# Patient Record
Sex: MALE | Race: WHITE | Employment: FULL TIME | RURAL
[De-identification: names, ages, dates, MRNs, and addresses within clinical notes are randomized per-mention and may not be internally consistent; named-entity substitution may affect disease eponyms.]

---
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
## 2022-03-14 ENCOUNTER — TELEPHONE (OUTPATIENT)
Dept: FAMILY MEDICINE CLINIC | Age: 22
End: 2022-03-14

## 2022-03-14 ENCOUNTER — OFFICE VISIT (OUTPATIENT)
Dept: FAMILY MEDICINE CLINIC | Age: 22
End: 2022-03-14
Payer: COMMERCIAL

## 2022-03-14 ENCOUNTER — NURSE TRIAGE (OUTPATIENT)
Dept: OTHER | Facility: CLINIC | Age: 22
End: 2022-03-14

## 2022-03-14 VITALS
OXYGEN SATURATION: 99 % | TEMPERATURE: 98.3 F | WEIGHT: 267.6 LBS | RESPIRATION RATE: 20 BRPM | HEART RATE: 70 BPM | DIASTOLIC BLOOD PRESSURE: 89 MMHG | SYSTOLIC BLOOD PRESSURE: 146 MMHG | BODY MASS INDEX: 37.46 KG/M2 | HEIGHT: 71 IN

## 2022-03-14 DIAGNOSIS — S69.91XA INJURY OF RIGHT HAND, INITIAL ENCOUNTER: ICD-10-CM

## 2022-03-14 DIAGNOSIS — S62.316A CLOSED DISPLACED FRACTURE OF BASE OF FIFTH METACARPAL BONE OF RIGHT HAND, INITIAL ENCOUNTER: Primary | ICD-10-CM

## 2022-03-14 PROCEDURE — 99203 OFFICE O/P NEW LOW 30 MIN: CPT | Performed by: FAMILY MEDICINE

## 2022-03-14 NOTE — PROGRESS NOTES
1. Have you been to the ER, urgent care clinic since your last visit? Hospitalized since your last visit? No    2. Have you seen or consulted any other health care providers outside of the [address] [place] [address] since your last visit? Include any pap smears or colon screening. Yes When: has raymond fregoso gastro dr Veronica Pandey - 2 months ago     3. For patients aged 39-70: Has the patient had a colonoscopy / FIT/ Cologuard? Yes     If the patient is female:    4. For patients aged 41-77: Has the patient had a mammogram within the past 2 years? NA - based on age or sex      11. For patients aged 21-65: Has the patient had a pap smear? NA - based on age or sex     Reviewed record in preparation for visit and have necessary documentation  Pt did not bring medication to office visit for review  Patient is accompanied by self I have received verbal consent from Mansi Tracey to discuss any/all medical information while they are present in the room.     Goals that were addressed and/or need to be completed during or after this appointment include     Health Maintenance Due   Topic Date Due    Depression Screen  Never done    COVID-19 Vaccine (1) Never done    HPV Age 9Y-34Y (4 - Male 3-dose series) 12/02/2017    Flu Vaccine (1) 09/01/2021

## 2022-03-14 NOTE — TELEPHONE ENCOUNTER
Pt made aware that work note was ready for pickup at the . Asked pt about pain because Dr. Reza Gross stated he could order pain medication for him but pt stated that he could hold out until tomorrow when he sees ortho.

## 2022-03-14 NOTE — TELEPHONE ENCOUNTER
Received call from [facility] at [facility] with [employer] Complaint. Limited triage due to caller not with pt    Subjective: Caller states \"he fell late Friday night/ Saturday morning and landed on his right hand and it is swollen and bruised. Unable to  anything. Mom thinks it may be broken\"     Current Symptoms: swollen bruised right hand    Onset: 2 days ago; sudden    Associated Symptoms: NA    Pain Severity: Mom's guess is 7-8/10   Temperature:denies fever    What has been tried: ice, Tylenol    LMP: NA Pregnant: NA    Recommended disposition: See in Office Today    Care advice provided, patient verbalizes understanding; denies any other questions or concerns; instructed to call back for any new or worsening symptoms. Patient/Caller agrees with recommended disposition; writer provided warm transfer to Yousuf at [facility] for appointment scheduling    Attention Provider: Thank you for allowing me to participate in the care of your patient. The patient was connected to triage in response to information provided to the ECC. Please do not respond through this encounter as the response is not directed to a shared pool.       Reason for Disposition   Large swelling or bruise and size > palm of person's hand    Protocols used: HAND AND WRIST INJURY-ADULT-OH
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
## 2022-03-15 ENCOUNTER — OFFICE VISIT (OUTPATIENT)
Dept: ORTHOPEDIC SURGERY | Age: 22
End: 2022-03-15
Payer: COMMERCIAL

## 2022-03-15 VITALS — HEIGHT: 70 IN | BODY MASS INDEX: 37.94 KG/M2 | WEIGHT: 265 LBS

## 2022-03-15 DIAGNOSIS — S62.316A CLOSED DISPLACED FRACTURE OF BASE OF FIFTH METACARPAL BONE OF RIGHT HAND, INITIAL ENCOUNTER: Primary | ICD-10-CM

## 2022-03-15 PROCEDURE — 29125 APPL SHORT ARM SPLINT STATIC: CPT | Performed by: ORTHOPAEDIC SURGERY

## 2022-03-15 PROCEDURE — 99203 OFFICE O/P NEW LOW 30 MIN: CPT | Performed by: ORTHOPAEDIC SURGERY

## 2022-03-15 NOTE — LETTER
NOTIFICATION TO RETURN TO WORK / SCHOOL           Mr. Tammy Tracey  [address] [address]        To Whom It May Concern:      Please excuse Tammy Tracey for an appointment in our office on 3/15/2022. If you have any questions, or if we may be of further assistance, do not hesitate to contact us at [phone]. Restrictions: No work for 3 weeks.       Sincerely,    Adonis Quintero MD  [facility]
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
## 2022-03-18 PROBLEM — H52.209 ASTIGMATISM: Status: ACTIVE | Noted: 2017-02-20

## 2022-03-23 ENCOUNTER — OFFICE VISIT (OUTPATIENT)
Dept: ORTHOPEDIC SURGERY | Age: 22
End: 2022-03-23
Payer: COMMERCIAL

## 2022-03-23 VITALS — WEIGHT: 265 LBS | BODY MASS INDEX: 38.02 KG/M2

## 2022-03-23 DIAGNOSIS — S62.316D DISPLACED FRACTURE OF BASE OF FIFTH METACARPAL BONE, RIGHT HAND, SUBSEQUENT ENCOUNTER FOR FRACTURE WITH ROUTINE HEALING: Primary | ICD-10-CM

## 2022-03-23 PROCEDURE — 99213 OFFICE O/P EST LOW 20 MIN: CPT | Performed by: ORTHOPAEDIC SURGERY

## 2022-03-23 NOTE — PROGRESS NOTES
Brianna Reed (: 2000) is a 22 y.o. male patient here for evaluation of the following chief complaint(s):  Hand Pain (right fifth metacarpal fracture folllow up)       ASSESSMENT/PLAN:  Below is the assessment and plan developed based on review of pertinent history, physical exam, labs, studies, and medications. 1. Displaced fracture of base of fifth metacarpal bone, right hand, subsequent encounter for fracture with routine healing  -     XR HAND RT MIN 3 V; Future      25-year-old male with right small finger metacarpal base fracture. Currently he has well-maintained joint space and overall good alignment. Recommended continuing conservative management. Recommended follow-up in 2 week for reevaluation, removal of cast prior to x-rays, and repeat x-rays 3 views of right hand. If doing well at next visit recommend discontinuing the splint transition to a removable brace and slowly increase activity. Follow-up and Dispositions    · Return in about 2 weeks (around 2022). Patient verbalized understanding and elected to proceed. All questions were answered to the patient's apparent satisfaction. SUBJECTIVE/OBJECTIVE:  HPI  25-year-old male following up on his right small finger metacarpal base fracture. Patient states pain is improving. States the splint is comfortable. Patient reports a sudden onset of symptoms. Duration of problem 2 weeks, occurred on 3/11/2022  Symptom Severity 6/10  Symptom Frequency intermittent        No Known Allergies    Current Outpatient Medications   Medication Sig    azaTHIOprine (IMURAN) 50 mg tablet Take 3 Tabs by mouth daily.  montelukast (SINGULAIR) 10 mg tablet TAKE 1 TABLET BY MOUTH ONCE DAILY FOR ALLERGIES (Patient not taking: Reported on 3/14/2022)    benzonatate (TESSALON) 200 mg capsule Take 1 Cap by mouth three (3) times daily as needed for Cough.  (Patient not taking: Reported on 3/14/2022)    ondansetron (ZOFRAN ODT) 8 mg disintegrating tablet Take 1 Tab by mouth every eight (8) hours as needed for Nausea. (Patient not taking: Reported on 3/14/2022)    diclofenac (VOLTAREN) 1 % gel Apply 0.5 g to affected area four (4) times daily as needed for Pain. Apply to right ankle for pain. (Patient not taking: Reported on 3/14/2022)    omeprazole (PRILOSEC) 20 mg capsule Take 20 mg by mouth daily. (Patient not taking: Reported on 3/23/2022)    mesalamine ER (APRISO) 0.375 gram capsule Take 4 Caps by mouth daily. (Patient not taking: Reported on 3/15/2022)    traZODone (DESYREL) 50 mg tablet Take  by mouth nightly. (Patient not taking: Reported on 3/14/2022)    escitalopram oxalate (LEXAPRO) 20 mg tablet Take 1 tablet by mouth daily. (Patient not taking: Reported on 3/14/2022)    minocycline (MINOCIN, DYNACIN) 100 mg capsule Take 1 capsule by mouth. (Patient not taking: Reported on 3/14/2022)    multivitamin (ONE A DAY) tablet Take 1 Tab by mouth daily. (Patient not taking: Reported on 3/23/2022)     No current facility-administered medications for this visit. Social History     Socioeconomic History    Marital status: SINGLE     Spouse name: Not on file    Number of children: Not on file    Years of education: Not on file    Highest education level: Not on file   Occupational History    Not on file   Tobacco Use    Smoking status: Never Smoker    Smokeless tobacco: Never Used   Substance and Sexual Activity    Alcohol use: No    Drug use: No    Sexual activity: Never   Other Topics Concern    Not on file   Social History Narrative    Not on file     Social Determinants of Health     Financial Resource Strain:     Difficulty of Paying Living Expenses: Not on file   Food Insecurity:     Worried About Running Out of Food in the Last Year: Not on file    Joey of Food in the Last Year: Not on file   Transportation Needs:     Lack of Transportation (Medical):  Not on file    Lack of Transportation (Non-Medical): Not on file   Physical Activity:     Days of Exercise per Week: Not on file    Minutes of Exercise per Session: Not on file   Stress:     Feeling of Stress : Not on file   Social Connections:     Frequency of Communication with Friends and Family: Not on file    Frequency of Social Gatherings with Friends and Family: Not on file    Attends [employer] Services: Not on file    Active Member of [address] [place] [address] or Organizations: Not on file    Attends Club or Organization Meetings: Not on file    Marital Status: Not on file   Intimate Partner Violence:     Fear of Current or Ex-Partner: Not on file    Emotionally Abused: Not on file    Physically Abused: Not on file    Sexually Abused: Not on file   Housing Stability:     Unable to Pay for Housing in the Last Year: Not on file    Number of [place] in the Last Year: Not on file    Unstable Housing in the Last Year: Not on file       Past Surgical History:   Procedure Laterality Date    HX COLONOSCOPY      HX ENDOSCOPY      [place] BIOPSY OF BREAST, INCISIONAL      liver        History reviewed. No pertinent family history. Review of Systems    No flowsheet data found. Vitals: Wt 265 lb (120.2 kg)   BMI 38.02 kg/m²    Estimated body surface area is 2.44 meters squared as calculated from the following:    Height as of 3/15/22: 5' 10\" (1.778 m). Weight as of this encounter: 265 lb (120.2 kg). Body mass index is 38.02 kg/m². Physical Exam    Musculoskeletal Exam:    Right Upper Extremity EXAMINATION    Splint is intact, move digits with no difficulty. Patient has intact range of motion of the hand and all digits. Some pain with range of motion. Patient fires AIN, PIN and ulnar nerves. Sensation is grossly intact in the median, radial and ulnar distribution. Hand is pink and appears well-perfused. Hand is warm. Skin is intact. Compartments are soft and compressible.       Consitutional: Healthy  Skin:   - Edema -minimal right hand  - Cellulitis - No    Neuro: Numbness or tingling in R/L arm: no    Psych: Affect normal    Cardiovascular: Capillary Refill < 2 seconds in upper extremities    Respiratory: Non-Labored Breathing    ROS:    Constitutional: Denies fever/chills    Respiratory: Denies SOB        Imaging:    XR Results (most recent):  Results from Appointment encounter on 03/23/22    XR HAND RT MIN 3 V    Narrative  Right Hand Xray  Indication: pain  Views: 3 views, AP/LAT/OBL    Interpretation: 3 views of the right hand are reviewed and show unchanged alignment from the prior images. There remains a small finger metacarpal base fracture. The joint is well maintained. No other new abnormalities are noted. Orders Placed This Encounter    XR HAND RT MIN 3 V     Standing Status:   Future     Number of Occurrences:   1     Standing Expiration Date:   3/24/2023          An electronic signature was used to authenticate this note.   -- Aislinn Alexandre MD

## 2022-04-05 ENCOUNTER — OFFICE VISIT (OUTPATIENT)
Dept: ORTHOPEDIC SURGERY | Age: 22
End: 2022-04-05

## 2022-04-05 DIAGNOSIS — S62.316D DISPLACED FRACTURE OF BASE OF FIFTH METACARPAL BONE, RIGHT HAND, SUBSEQUENT ENCOUNTER FOR FRACTURE WITH ROUTINE HEALING: Primary | ICD-10-CM

## 2022-04-05 PROCEDURE — 99213 OFFICE O/P EST LOW 20 MIN: CPT | Performed by: ORTHOPAEDIC SURGERY

## 2022-04-05 NOTE — LETTER
NOTIFICATION TO RETURN TO WORK / SCHOOL           Mr. Dheeraj Sue  [address] [address]        To Whom It May Concern:      Please excuse Dheeraj Sue for an appointment in our office on 4/5/2022. If you have any questions, or if we may be of further assistance, do not hesitate to contact us at [phone]. Restrictions:    He is not to work prior to April 18, 2022.     Sincerely,    Lisa Vance MD  [facility]

## 2022-04-05 NOTE — PROGRESS NOTES
Carrie De La Vega (: 2000) is a 22 y.o. male patient here for evaluation of the following chief complaint(s):  Follow-up (right hand)       ASSESSMENT/PLAN:  Below is the assessment and plan developed based on review of pertinent history, physical exam, labs, studies, and medications. 1. Displaced fracture of base of fifth metacarpal bone, right hand, subsequent encounter for fracture with routine healing  -     XR HAND RT MIN 3 V; Future  -     REFERRAL TO DME      60-year-old male with right small finger metacarpal base fracture. Patient is doing well at this time. He was transition to a removable brace. He can return to work full duty on 2022 as long as he is asymptomatic. We discussed follow-up today. Patient verbalized understanding and elected to proceed. All questions were answered to the patient's apparent satisfaction. SUBJECTIVE/OBJECTIVE:  Hand Pain    Follow-up      60-year-old male following up on his right small finger metacarpal base fracture. Patient states pain is improving. States the splint is comfortable. Patient reports a sudden onset of symptoms. Duration of problem 4 weeks, occurred on 3/11/2022  Symptom Severity 4/10  Symptom Frequency intermittent        No Known Allergies    Current Outpatient Medications   Medication Sig    montelukast (SINGULAIR) 10 mg tablet TAKE 1 TABLET BY MOUTH ONCE DAILY FOR ALLERGIES (Patient not taking: Reported on 3/14/2022)    benzonatate (TESSALON) 200 mg capsule Take 1 Cap by mouth three (3) times daily as needed for Cough. (Patient not taking: Reported on 3/14/2022)    ondansetron (ZOFRAN ODT) 8 mg disintegrating tablet Take 1 Tab by mouth every eight (8) hours as needed for Nausea. (Patient not taking: Reported on 3/14/2022)    diclofenac (VOLTAREN) 1 % gel Apply 0.5 g to affected area four (4) times daily as needed for Pain. Apply to right ankle for pain.  (Patient not taking: Reported on 3/14/2022)    omeprazole (PRILOSEC) 20 mg capsule Take 20 mg by mouth daily. (Patient not taking: Reported on 3/23/2022)    azaTHIOprine (IMURAN) 50 mg tablet Take 3 Tabs by mouth daily.  mesalamine ER (APRISO) 0.375 gram capsule Take 4 Caps by mouth daily. (Patient not taking: Reported on 3/15/2022)    traZODone (DESYREL) 50 mg tablet Take  by mouth nightly. (Patient not taking: Reported on 3/14/2022)    escitalopram oxalate (LEXAPRO) 20 mg tablet Take 1 tablet by mouth daily. (Patient not taking: Reported on 3/14/2022)    minocycline (MINOCIN, DYNACIN) 100 mg capsule Take 1 capsule by mouth. (Patient not taking: Reported on 3/14/2022)    multivitamin (ONE A DAY) tablet Take 1 Tab by mouth daily. (Patient not taking: Reported on 3/23/2022)     No current facility-administered medications for this visit. Social History     Socioeconomic History    Marital status: SINGLE     Spouse name: Not on file    Number of children: Not on file    Years of education: Not on file    Highest education level: Not on file   Occupational History    Not on file   Tobacco Use    Smoking status: Never Smoker    Smokeless tobacco: Never Used   Substance and Sexual Activity    Alcohol use: No    Drug use: No    Sexual activity: Never   Other Topics Concern    Not on file   Social History Narrative    Not on file     Social Determinants of Health     Financial Resource Strain:     Difficulty of Paying Living Expenses: Not on file   Food Insecurity:     Worried About Running Out of Food in the Last Year: Not on file    Joey of Food in the Last Year: Not on file   Transportation Needs:     Lack of Transportation (Medical): Not on file    Lack of Transportation (Non-Medical):  Not on file   Physical Activity:     Days of Exercise per Week: Not on file    Minutes of Exercise per Session: Not on file   Stress:     Feeling of Stress : Not on file   Social Connections:     Frequency of Communication with Friends and Family: Not on file    Frequency of Social Gatherings with Friends and Family: Not on file    Attends [employer] Services: Not on file    Active Member of Clubs or Organizations: Not on file    Attends Club or Organization Meetings: Not on file    Marital Status: Not on file   Intimate Partner Violence:     Fear of Current or Ex-Partner: Not on file    Emotionally Abused: Not on file    Physically Abused: Not on file    Sexually Abused: Not on file   Housing Stability:     Unable to Pay for Housing in the Last Year: Not on file    Number of [place] in the Last Year: Not on file    Unstable Housing in the Last Year: Not on file       Past Surgical History:   Procedure Laterality Date    HX COLONOSCOPY      HX ENDOSCOPY      [place] BIOPSY OF BREAST, INCISIONAL      liver        History reviewed. No pertinent family history. Review of Systems    No flowsheet data found. Vitals: There were no vitals taken for this visit. Estimated body surface area is 2.44 meters squared as calculated from the following:    Height as of 3/15/22: 5' 10\" (1.778 m). Weight as of 3/23/22: 265 lb (120.2 kg). There is no height or weight on file to calculate BMI. Physical Exam    Musculoskeletal Exam:    Right Upper Extremity EXAMINATION    Splint removed, skin intact. Mild tenderness over the fracture site. Patient has intact range of motion of the hand and all digits. Some soreness with range of motion. Patient fires AIN, PIN and ulnar nerves. Sensation is grossly intact in the median, radial and ulnar distribution. Hand is pink and appears well-perfused. Hand is warm. Skin is intact. Compartments are soft and compressible.       Consitutional: Healthy  Skin:   - Edema -minimal right hand  - Cellulitis - No    Neuro: Numbness or tingling in R/L arm: no    Psych: Affect normal    Cardiovascular: Capillary Refill < 2 seconds in upper extremities    Respiratory: Non-Labored Breathing    ROS:    Constitutional: Denies fever/chills    Respiratory: Denies SOB        Imaging:    XR Results (most recent):  Results from Appointment encounter on 04/05/22    XR HAND RT MIN 3 V    Narrative  Right Hand Xray  Indication: pain  Views: 3 views, AP/LAT/OBL    Interpretation: 3 views of the right hand are reviewed and show unchanged alignment from the prior images. There remains a small finger metacarpal base fracture. The joint is well maintained. There is interval callous formation noted today. Orders Placed This Encounter    XR HAND RT MIN 3 V     OOC     Standing Status:   Future     Number of Occurrences:   1     Standing Expiration Date:   4/5/2023    REFERRAL TO DME     Referral Priority:   Routine     Referral Type:   Consultation     Referral Reason:   Specialty Services Required     Number of Visits Requested:   1          An electronic signature was used to authenticate this note.   -- Kandice Lewis MD

## 2023-02-07 ENCOUNTER — OFFICE VISIT (OUTPATIENT)
Dept: FAMILY MEDICINE CLINIC | Age: 23
End: 2023-02-07
Payer: COMMERCIAL

## 2023-02-07 VITALS
BODY MASS INDEX: 38.51 KG/M2 | OXYGEN SATURATION: 98 % | DIASTOLIC BLOOD PRESSURE: 92 MMHG | TEMPERATURE: 98.7 F | HEIGHT: 70 IN | SYSTOLIC BLOOD PRESSURE: 148 MMHG | HEART RATE: 100 BPM | RESPIRATION RATE: 18 BRPM | WEIGHT: 269 LBS

## 2023-02-07 DIAGNOSIS — J02.9 SORE THROAT: ICD-10-CM

## 2023-02-07 DIAGNOSIS — J35.8 TONSILLAR EXUDATE: Primary | ICD-10-CM

## 2023-02-07 DIAGNOSIS — R05.1 ACUTE COUGH: ICD-10-CM

## 2023-02-07 DIAGNOSIS — I10 HYPERTENSION, UNSPECIFIED TYPE: ICD-10-CM

## 2023-02-07 LAB
S PYO AG THROAT QL: NEGATIVE
VALID INTERNAL CONTROL?: YES

## 2023-02-07 RX ORDER — AMOXICILLIN 500 MG/1
500 CAPSULE ORAL 2 TIMES DAILY
Qty: 20 CAPSULE | Refills: 0 | Status: SHIPPED | OUTPATIENT
Start: 2023-02-07 | End: 2023-02-17

## 2023-02-07 NOTE — PROGRESS NOTES
History of Present Illness:  Pat Guidry is a 25 y.o. male who presents for cough and sore throat. Symptoms started 2 weeks ago with a wet cough (productive of green sputum) and some nasal congestion. He developed a sore throat this past week. Denies fevers, hemoptysis, ear pain, sick contacts, GI symps. Has not been vaccinated for Flu or Covid. Lives at home with his mom and sisters who have not had similar symps. Past Medical History:   Diagnosis Date    Allergic rhinitis     Crohn disease ([address]        Past Surgical History:   Procedure Laterality Date    HX COLONOSCOPY      HX ENDOSCOPY      AR BIOPSY BREAST OPEN INCISIONAL      liver        Current Outpatient Medications   Medication Sig Dispense Refill    guaiFENesin-dextromethorphan SR (HUMIBID DM) 600-30 mg per tablet Take 1 Tablet by mouth two (2) times a day for 10 days. 20 Tablet 0    benzocaine 15 mg lozg 15 mg by Mucous Membrane route two (2) times daily as needed for Pain or Cough for up to 10 days. 20 Lozenge 0    amoxicillin (AMOXIL) 500 mg capsule Take 1 Capsule by mouth two (2) times a day for 10 days. 20 Capsule 0    omeprazole (PRILOSEC) 20 mg capsule Take 20 mg by mouth daily. multivitamin (ONE A DAY) tablet Take 1 Tablet by mouth daily. montelukast (SINGULAIR) 10 mg tablet TAKE 1 TABLET BY MOUTH ONCE DAILY FOR ALLERGIES (Patient not taking: Reported on 3/14/2022) 30 Tab 2    benzonatate (TESSALON) 200 mg capsule Take 1 Cap by mouth three (3) times daily as needed for Cough. (Patient not taking: Reported on 3/14/2022) 30 Cap 1    ondansetron (ZOFRAN ODT) 8 mg disintegrating tablet Take 1 Tab by mouth every eight (8) hours as needed for Nausea. (Patient not taking: Reported on 3/14/2022) 20 Tab 0    diclofenac (VOLTAREN) 1 % gel Apply 0.5 g to affected area four (4) times daily as needed for Pain. Apply to right ankle for pain.  (Patient not taking: Reported on 3/14/2022) 1 Each 0    azaTHIOprine (IMURAN) 50 mg tablet Take 3 Tabs by mouth daily. 75 Tab 2    mesalamine ER (APRISO) 0.375 gram capsule Take 4 Caps by mouth daily. (Patient not taking: Reported on 3/15/2022) 30 Cap 2    traZODone (DESYREL) 50 mg tablet Take  by mouth nightly. (Patient not taking: Reported on 3/14/2022)      escitalopram oxalate (LEXAPRO) 20 mg tablet Take 1 tablet by mouth daily. (Patient not taking: Reported on 3/14/2022)      minocycline (MINOCIN, DYNACIN) 100 mg capsule Take 1 capsule by mouth. (Patient not taking: Reported on 3/14/2022)         No Known Allergies    Social History     Tobacco Use    Smoking status: Never    Smokeless tobacco: Never   Substance Use Topics    Alcohol use: No    Drug use: No       History reviewed. No pertinent family history. Physical Examination:     Visit Vitals  BP (!) 148/92   Pulse 100   Temp 98.7 °F (37.1 °C) (Oral)   Resp 18   Ht 5' 10\" (1.778 m)   Wt 269 lb (122 kg)   SpO2 98%   BMI 38.60 kg/m²       GEN: No apparent distress. Alert and oriented and responds to all questions appropriately. EYES:  Conjunctiva clear; extraocular movements areintact. EAR: ADRIANO ear canals erythematous, ADRIANO tympanic membranes slightly congested. External ears are normal.   NOSE: Turbinates are within normal limits. No drainage  OROPHYARYNX: White striations and tonsillar erythema bilaterally  NECK:  tender to mild palpation of submandibular and cervical lymph nodes      LUNGS: Respirations unlabored; clear to auscultation bilaterally  CARDIOVASCULAR: Regular, rate, and rhythm without murmurs, gallops or rubs   NEUROLOGIC:  No focal neurologic deficits. Coordination and gait grossly intact. EXT: Well perfused. No edema. SKIN: No obvious rashes. Assessment/Plan:    Diagnoses and all orders for this visit:    1. Tonsillar exudate - acute  - rapid strep negative.  However, given significant exam findings of white tonsillar striations and tender lymph nodes will treat for strep throat  - rapid covid negative    -     AMB POC RAPID STREP A  -     amoxicillin (AMOXIL) 500 mg capsule; Take 1 Capsule by mouth two (2) times a day for 10 days. 2. Acute cough   - bothersome productive cough for 2 weeks. -     guaiFENesin-dextromethorphan SR (HUMIBID DM) 600-30 mg per tablet; Take 1 Tablet by mouth two (2) times a day for 10 days. 3. Sore throat - acute  - most likely 2/2 strep given white tonsillar striations and erythema    -     benzocaine 15 mg lozg; 15 mg by Mucous Membrane route two (2) times daily as needed for Pain or Cough for up to 10 days.  -     AMB POC RAPID STREP A    4. Hypertension - uncontrolled   - BP elevated x2 in office today. Per chart review, seems he has had elevated readings since 3/2022. Unclear whether white coat HTN or true HTN.   - Patient says his mom has a BP machine at home. He was given a BP log and asked to record his home BP BID and follow up with our office in 1-2 weeks. Encounter Diagnoses     ICD-10-CM ICD-9-CM   1. Tonsillar exudate  J35.8 474.8   2. Acute cough  R05.1 786.2   3. Sore throat  J02.9 462   4. Hypertension, unspecified type  I10 401.9         Follow-up and Dispositions    Return in about 2 weeks (around 2/21/2023) for BP check. Mattie Moya expressed understanding of this plan. An AVS was printed and given to the patient. Seen and discussed with attending.     Eusebio Ghotra MD  Family Medicine PGY-1

## 2023-02-07 NOTE — LETTER
NOTIFICATION RETURN TO WORK    2/7/2023 4:53 PM    Mr. Mica Ferguson  [address] [place] [address]      To Whom It May Concern:    Mica Ferguson is currently under the care of [facility]  Please excuse him for 2/7 and 2/8. He will return to work on 2/9. If there are questions or concerns please have the patient contact our office.         Sincerely,      Kwabena Pruett MD

## 2023-02-07 NOTE — PROGRESS NOTES
1. Have you been to the ER, urgent care clinic since your last visit? Hospitalized since your last visit?  no    2. Have you seen or consulted any other health care providers outside of the [address] [place] [address] since your last visit? GI doc but doesn't recall name      3. For patients aged 39-70: Has the patient had a colonoscopy / FIT/ Cologuard? Yes 2022      If the patient is female:    4. For patients aged 41-77: Has the patient had a mammogram within the past 2 years? 5. For patients aged 21-65: Has the patient had a pap smear?        Opportunity was given for questions    Goals that were addressed and/or need to be completed during or after this appointment include   Health Maintenance Due   Topic Date Due    Hepatitis C Screening  Never done    COVID-19 Vaccine (1) Never done    HPV Age 9Y-34Y (4 - Male 3-dose series) 12/02/2017    Shingles Vaccine (1 of 2) Never done    Flu Vaccine (1) 08/01/2022

## 2023-02-09 ENCOUNTER — TELEPHONE (OUTPATIENT)
Dept: FAMILY MEDICINE CLINIC | Age: 23
End: 2023-02-09

## 2023-07-20 ENCOUNTER — OFFICE VISIT (OUTPATIENT)
Facility: CLINIC | Age: 23
End: 2023-07-20
Payer: COMMERCIAL

## 2023-07-20 VITALS
HEART RATE: 78 BPM | TEMPERATURE: 97.7 F | SYSTOLIC BLOOD PRESSURE: 129 MMHG | RESPIRATION RATE: 18 BRPM | DIASTOLIC BLOOD PRESSURE: 82 MMHG | WEIGHT: 268.2 LBS | OXYGEN SATURATION: 99 % | HEIGHT: 70 IN | BODY MASS INDEX: 38.39 KG/M2

## 2023-07-20 DIAGNOSIS — Z23 NEED FOR VIRAL IMMUNIZATION: ICD-10-CM

## 2023-07-20 DIAGNOSIS — K50.90 CROHN'S DISEASE WITHOUT COMPLICATION, UNSPECIFIED GASTROINTESTINAL TRACT LOCATION (HCC): ICD-10-CM

## 2023-07-20 DIAGNOSIS — Z00.00 VISIT FOR WELL MAN HEALTH CHECK: ICD-10-CM

## 2023-07-20 DIAGNOSIS — Z00.00 ENCOUNTER FOR WELL ADULT EXAM WITHOUT ABNORMAL FINDINGS: Primary | ICD-10-CM

## 2023-07-20 PROCEDURE — 99395 PREV VISIT EST AGE 18-39: CPT | Performed by: FAMILY MEDICINE

## 2023-07-20 SDOH — ECONOMIC STABILITY: FOOD INSECURITY: WITHIN THE PAST 12 MONTHS, YOU WORRIED THAT YOUR FOOD WOULD RUN OUT BEFORE YOU GOT MONEY TO BUY MORE.: NEVER TRUE

## 2023-07-20 SDOH — ECONOMIC STABILITY: INCOME INSECURITY: HOW HARD IS IT FOR YOU TO PAY FOR THE VERY BASICS LIKE FOOD, HOUSING, MEDICAL CARE, AND HEATING?: NOT HARD AT ALL

## 2023-07-20 SDOH — ECONOMIC STABILITY: HOUSING INSECURITY
IN THE LAST 12 MONTHS, WAS THERE A TIME WHEN YOU DID NOT HAVE A STEADY PLACE TO SLEEP OR SLEPT IN A SHELTER (INCLUDING NOW)?: NO

## 2023-07-20 SDOH — ECONOMIC STABILITY: FOOD INSECURITY: WITHIN THE PAST 12 MONTHS, THE FOOD YOU BOUGHT JUST DIDN'T LAST AND YOU DIDN'T HAVE MONEY TO GET MORE.: NEVER TRUE

## 2023-07-20 ASSESSMENT — ANXIETY QUESTIONNAIRES
3. WORRYING TOO MUCH ABOUT DIFFERENT THINGS: 0
2. NOT BEING ABLE TO STOP OR CONTROL WORRYING: 0
4. TROUBLE RELAXING: 0
IF YOU CHECKED OFF ANY PROBLEMS ON THIS QUESTIONNAIRE, HOW DIFFICULT HAVE THESE PROBLEMS MADE IT FOR YOU TO DO YOUR WORK, TAKE CARE OF THINGS AT HOME, OR GET ALONG WITH OTHER PEOPLE: NOT DIFFICULT AT ALL
1. FEELING NERVOUS, ANXIOUS, OR ON EDGE: 0
6. BECOMING EASILY ANNOYED OR IRRITABLE: 0
5. BEING SO RESTLESS THAT IT IS HARD TO SIT STILL: 0
GAD7 TOTAL SCORE: 0
7. FEELING AFRAID AS IF SOMETHING AWFUL MIGHT HAPPEN: 0

## 2023-07-20 ASSESSMENT — ENCOUNTER SYMPTOMS
APNEA: 0
BACK PAIN: 0
CHEST TIGHTNESS: 0

## 2023-07-20 ASSESSMENT — PATIENT HEALTH QUESTIONNAIRE - PHQ9
SUM OF ALL RESPONSES TO PHQ QUESTIONS 1-9: 0
SUM OF ALL RESPONSES TO PHQ QUESTIONS 1-9: 0
SUM OF ALL RESPONSES TO PHQ9 QUESTIONS 1 & 2: 0
1. LITTLE INTEREST OR PLEASURE IN DOING THINGS: 0
SUM OF ALL RESPONSES TO PHQ QUESTIONS 1-9: 0
SUM OF ALL RESPONSES TO PHQ QUESTIONS 1-9: 0
2. FEELING DOWN, DEPRESSED OR HOPELESS: 0

## 2023-07-20 NOTE — PROGRESS NOTES
1. \"Have you been to the ER, urgent care clinic since your last visit? Hospitalized since your last visit? \" no    2. \"Have you seen or consulted any other health care providers outside of the [address] [place] [address] since your last visit? \" no     Health Maintenance Due   Topic Date Due    COVID-19 Vaccine (1) Never done    Pneumococcal 0-64 years Vaccine (1 - PCV) 06/11/2007    HIV screen  Never done    HPV vaccine (3 - Male 3-dose series) 12/02/2017    Hepatitis C screen  Never done    Shingles vaccine (1 of 2) 06/11/2020    DTaP/Tdap/Td vaccine (7 - Td or Tdap) 05/23/2023
atraumatic. Cardiovascular:      Rate and Rhythm: Normal rate and regular rhythm. Pulses: Normal pulses. Heart sounds: Normal heart sounds. No murmur heard. No friction rub. No gallop. Pulmonary:      Effort: Pulmonary effort is normal.      Breath sounds: Normal breath sounds. Abdominal:      General: Abdomen is flat. Bowel sounds are normal.      Palpations: Abdomen is soft. Musculoskeletal:         General: Normal range of motion. Cervical back: Normal range of motion and neck supple. Skin:     General: Skin is warm and dry. Neurological:      Mental Status: He is alert. Pertinent Labs/Studies:  No results found for: PSA, PSA2, PSAR1, PSA1, PSA3, [id number], PSAEXT  No results found for: CHOL, CHOLPOCT, CHOLX, CHLST, CHOLV, HDL, HDLPOC, HDLC, LDL, LDLC, VLDLC, VLDL, TGLX, TRIGL  No results found for: NA, K, CL, CO2, AGAP, GLU, BUN, CREA, GFRAA, CA, TP, ALB, GLOB, ALT    Assessment and orders:      Diagnosis Orders   1. Encounter for well adult exam without abnormal findings        2. Visit for [facility] health check        3. Crohn's disease without complication, unspecified gastrointestinal tract location ([address])        4. Need for viral immunization  Tetanus-Diphth-Acell Pertussis ([facility]) 5-2.5-18.5 LF-MCG/0.5 injection        Noman Pal was seen today for employment physical.    Diagnoses and all orders for this visit:    Encounter for well adult exam without abnormal findings    Visit for [facility] health check    Crohn's disease without complication, unspecified gastrointestinal tract location [facility])    Need for viral immunization  -     Tetanus-Diphth-Acell Pertussis ([facility]) 5-2.5-18.5 LF-MCG/0.5 injection; Inject 0.5 mLs into the muscle once for 1 dose      Follow-up and Dispositions    Return in about 1 year (around 7/20/2024). I have discussed the diagnosis with the patient and the intended plan as seen in the above orders.   Social history, medical history, and

## 2023-11-14 ENCOUNTER — OFFICE VISIT (OUTPATIENT)
Facility: CLINIC | Age: 23
End: 2023-11-14
Payer: COMMERCIAL

## 2023-11-14 VITALS
WEIGHT: 257.4 LBS | OXYGEN SATURATION: 99 % | BODY MASS INDEX: 36.85 KG/M2 | SYSTOLIC BLOOD PRESSURE: 140 MMHG | HEART RATE: 79 BPM | TEMPERATURE: 98.2 F | RESPIRATION RATE: 18 BRPM | DIASTOLIC BLOOD PRESSURE: 84 MMHG | HEIGHT: 70 IN

## 2023-11-14 DIAGNOSIS — K52.0 GASTROENTERITIS AND COLITIS DUE TO RADIATION: ICD-10-CM

## 2023-11-14 DIAGNOSIS — E78.2 MIXED HYPERLIPIDEMIA: Primary | ICD-10-CM

## 2023-11-14 PROCEDURE — 99214 OFFICE O/P EST MOD 30 MIN: CPT | Performed by: FAMILY MEDICINE

## 2023-11-14 RX ORDER — PRAVASTATIN SODIUM 40 MG
40 TABLET ORAL DAILY
Qty: 90 TABLET | Refills: 1 | Status: SHIPPED | OUTPATIENT
Start: 2023-11-14

## 2023-11-14 ASSESSMENT — ENCOUNTER SYMPTOMS
CHOKING: 0
SHORTNESS OF BREATH: 0
ABDOMINAL PAIN: 0
COUGH: 0
CHEST TIGHTNESS: 0

## 2023-11-14 NOTE — PROGRESS NOTES
[facility]    History of Present Illness:   Angel Luis Olivas is a 26 y.o. male with history of HLD, Obesity  CC: Follow up Cholesterol issues  History provided by patient and Records    HPI:  Patient had labs done for physical and noted to have a mildly elevated Triglycerides and LDL cholesterol. Other lab abnormalities included mildly elevated Hgb, and a mildly elevated bilirubin. Patient denies any issues with chest pain or other issues. Family history of heart issues after 61+ y.o.. Health Maintenance  Health Maintenance Due   Topic Date Due    COVID-19 Vaccine (1) Never done    HPV vaccine (3 - Male 3-dose series) 12/02/2017    Hepatitis C screen  Never done    DTaP/Tdap/Td vaccine (7 - Td or Tdap) 05/23/2023    Flu vaccine (1) 08/01/2023       Past Medical, Family, and Social History:     Current Outpatient Medications on File Prior to Visit   Medication Sig Dispense Refill    Multiple Vitamin (MULTIVITAMINS PO) Take 1 tablet by mouth daily      omeprazole (PRILOSEC) 20 MG delayed release capsule Take 1 capsule by mouth daily       No current facility-administered medications on file prior to visit.        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    Crohn disease ([address])    Allergic rhinitis       Social History     Socioeconomic History    Marital status: Single     Spouse name: None    Number of children: None    Years of education: None    Highest education level: None   Tobacco Use    Smoking status: Never    Smokeless tobacco: Never   Vaping Use    Vaping Use: Never used   Substance and Sexual Activity    Alcohol use: No    Drug use: No    Sexual activity: Defer     Social Determinants of Health     Financial Resource Strain: Low Risk  (7/20/2023)    Overall Financial Resource Strain (CARDIA)     Difficulty of Paying Living Expenses: Not hard at all   Food Insecurity: No Food Insecurity (7/20/2023)

## 2023-11-14 NOTE — PROGRESS NOTES
1. \"Have you been to the ER, urgent care clinic since your last visit? Hospitalized since your last visit? \" no    2. \"Have you seen or consulted any other health care providers outside of the [address] [place] [address] since your last visit? \" no         Health Maintenance Due   Topic Date Due    COVID-19 Vaccine (1) Never done    HPV vaccine (3 - Male 3-dose series) 12/02/2017    Hepatitis C screen  Never done    DTaP/Tdap/Td vaccine (7 - Td or Tdap) 05/23/2023    Flu vaccine (1) 08/01/2023

## 2023-11-27 NOTE — PROGRESS NOTES
Kranthi Connors 17 y.o. male 2000 
[place] 
[facility] 
[id number] [facility] Practice: Progress Note Encounter Date: 1/4/2019 Chief Complaint Patient presents with  Vomiting 1 day  Sore Throat  Dizziness  Chills History provided by patient and mother History of Present Illness Kranthi Connors is a 17 y.o. male who presents to clinic today for: 
 
Flu-like symptoms Patient present with cc of flu-like symptoms since last night. Patient endorses he has been having chills, sore throat, dizziness and vomiting. Mother is concerned as patient has a history of Crohn's disease and is currently on immunosuppressants. Review of Systems Review of Systems Constitutional: Positive for chills and fever. Cardiovascular: Negative for chest pain. Gastrointestinal: Positive for nausea and vomiting. Musculoskeletal: Positive for myalgias. Negative for back pain, joint pain and neck pain. Skin: Negative for itching and rash. Neurological: Negative for dizziness and headaches. Vitals/Objective:  
 
Vitals:  
 01/04/19 1418 01/04/19 1441 BP: 143/86 139/86 Pulse: 106 100 Resp: 20 Temp: 98.5 °F (36.9 °C) TempSrc: Oral   
SpO2: 98% Weight: 263 lb (119.3 kg) Height: 5' 8.5\" (1.74 m) Body mass index is 39.41 kg/m². Wt Readings from Last 3 Encounters:  
01/04/19 263 lb (119.3 kg) (>99 %, Z= 2.70)*  
07/06/18 263 lb 3.2 oz (119.4 kg) (>99 %, Z= 2.78)*  
05/31/18 259 lb (117.5 kg) (>99 %, Z= 2.75)* * Growth percentiles are based on CDC (Boys, 2-20 Years) data. Physical Exam  
Constitutional: He is oriented to person, place, and time. He appears well-developed and well-nourished. HENT:  
Head: Normocephalic and atraumatic. Right Ear: Tympanic membrane, external ear and ear canal normal.  
Left Ear: External ear and ear canal normal.  
Nose: Mucosal edema and rhinorrhea present.  Right sinus exhibits no frontal sinus tenderness. Left sinus exhibits no maxillary sinus tenderness and no frontal sinus tenderness. Mouth/Throat: Posterior oropharyngeal erythema present. No oropharyngeal exudate, posterior oropharyngeal edema or tonsillar abscesses. Eyes: Conjunctivae are normal. Right eye exhibits no discharge. Left eye exhibits no discharge. Neck: Neck supple. Cardiovascular: Normal rate and regular rhythm. Pulmonary/Chest: Effort normal and breath sounds normal.  
Abdominal: Soft. Bowel sounds are normal. He exhibits no distension. There is no tenderness. Neurological: He is alert and oriented to person, place, and time. No results found for this or any previous visit (from the past 24 hour(s)). Assessment and Plan:  
 
Encounter Diagnoses ICD-10-CM ICD-9-CM 1. Influenza B J10.1 487.1 1. Influenza B 
- oseltamivir (TAMIFLU) 75 mg capsule; Take 1 Cap by mouth two (2) times a day for 5 days. Dispense: 10 Cap; Refill: 0 
- ondansetron (ZOFRAN ODT) 8 mg disintegrating tablet; Take 1 Tab by mouth every eight (8) hours as needed for Nausea. Dispense: 20 Tab; Refill: 0 I have discussed the diagnosis with the patient and the intended plan as seen in the above orders. he has expressed understanding. The patient has received an after-visit summary and questions were answered concerning future plans. I have discussed medication side effects and warnings with the patient as well. Electronically Signed: Miguelangel Amaral MD 
  
History/Allergies Patients past medical, surgical and family histories were reviewed and updated. Past Medical History:  
Diagnosis Date  Crohn disease ([facility] [address] Past Surgical History:  
Procedure Laterality Date  BIOPSY OF BREAST, INCISIONAL    
 liver  HX COLONOSCOPY    
 HX ENDOSCOPY No family history on file. Social History Socioeconomic History  Marital status: SINGLE Spouse name: Not on file  Number of children: Not on file  Years of education: Not on file  Highest education level: Not on file Social Needs  Financial resource strain: Not on file  Food insecurity - worry: Not on file  Food insecurity - inability: Not on file  Transportation needs - medical: Not on file  Transportation needs - non-medical: Not on file Occupational History  Not on file Tobacco Use  Smoking status: Never Smoker  Smokeless tobacco: Never Used Substance and Sexual Activity  Alcohol use: No  
 Drug use: No  
 Sexual activity: No  
Other Topics Concern  Not on file Social History Narrative  Not on file No Known Allergies Disposition Follow-up Disposition: Not on File No future appointments. Current Medications after this visit Current Outpatient Medications Medication Sig  oseltamivir (TAMIFLU) 75 mg capsule Take 1 Cap by mouth two (2) times a day for 5 days.  ondansetron (ZOFRAN ODT) 8 mg disintegrating tablet Take 1 Tab by mouth every eight (8) hours as needed for Nausea.  montelukast (SINGULAIR) 10 mg tablet TAKE ONE TABLET BY MOUTH ONCE DAILY FOR  ALLERGIC  RHINITIS  diclofenac (VOLTAREN) 1 % gel Apply 0.5 g to affected area four (4) times daily as needed for Pain. Apply to right ankle for pain.  omeprazole (PRILOSEC) 20 mg capsule Take 20 mg by mouth daily.  azaTHIOprine (IMURAN) 50 mg tablet Take 3 Tabs by mouth daily.  mesalamine ER (APRISO) 0.375 gram capsule Take 4 Caps by mouth daily.  traZODone (DESYREL) 50 mg tablet Take  by mouth nightly.  escitalopram oxalate (LEXAPRO) 20 mg tablet Take 1 tablet by mouth daily.  minocycline (MINOCIN, DYNACIN) 100 mg capsule Take 1 capsule by mouth.  multivitamin (ONE A DAY) tablet Take 1 Tab by mouth daily. No current facility-administered medications for this visit. There are no discontinued medications. No

## 2024-04-03 ENCOUNTER — OFFICE VISIT (OUTPATIENT)
Facility: CLINIC | Age: 24
End: 2024-04-03
Payer: COMMERCIAL

## 2024-04-03 VITALS
OXYGEN SATURATION: 96 % | HEART RATE: 79 BPM | RESPIRATION RATE: 18 BRPM | SYSTOLIC BLOOD PRESSURE: 139 MMHG | BODY MASS INDEX: 37.54 KG/M2 | DIASTOLIC BLOOD PRESSURE: 85 MMHG | HEIGHT: 70 IN | TEMPERATURE: 97.1 F | WEIGHT: 262.2 LBS

## 2024-04-03 DIAGNOSIS — R51.9 NONINTRACTABLE EPISODIC HEADACHE, UNSPECIFIED HEADACHE TYPE: ICD-10-CM

## 2024-04-03 DIAGNOSIS — H11.32 SUBCONJUNCTIVAL HEMORRHAGE OF LEFT EYE: Primary | ICD-10-CM

## 2024-04-03 PROCEDURE — 99213 OFFICE O/P EST LOW 20 MIN: CPT | Performed by: FAMILY MEDICINE

## 2024-04-03 ASSESSMENT — PATIENT HEALTH QUESTIONNAIRE - PHQ9
1. LITTLE INTEREST OR PLEASURE IN DOING THINGS: NOT AT ALL
2. FEELING DOWN, DEPRESSED OR HOPELESS: NOT AT ALL
SUM OF ALL RESPONSES TO PHQ QUESTIONS 1-9: 0
SUM OF ALL RESPONSES TO PHQ9 QUESTIONS 1 & 2: 0
SUM OF ALL RESPONSES TO PHQ QUESTIONS 1-9: 0

## 2024-04-03 ASSESSMENT — ENCOUNTER SYMPTOMS
EYE PAIN: 0
PHOTOPHOBIA: 0
EYE REDNESS: 1
EYE ITCHING: 1
EYE DISCHARGE: 0

## 2024-04-03 NOTE — PROGRESS NOTES
[facility]  Chief Complaint   Patient presents with    Eye Problem     Blood vessel popped in L. eye x2 weeks       History of Present Illness:   Wlalace Dean is a 23 y.o. male       HPI:  C/o bloodshot L eye x 2 weeks. He had n/v 4 days prior. He has been rubbing it as it irritates him. No change in vision. Having more headaches. Takes tylenol which helps. Denies allergy symptoms.     He stopped taking pravastatin due to SE.    Health Maintenance  Health Maintenance Due   Topic Date Due    COVID-19 Vaccine (1) Never done    HPV vaccine (3 - Male 3-dose series) 12/02/2017    Hepatitis C screen  Never done    DTaP/Tdap/Td vaccine (7 - Td or Tdap) 05/23/2023       Past Medical, Family, and Social History:     Past Medical History:   Diagnosis Date    Allergic rhinitis     Crohn disease (HCC)       Past Surgical History:   Procedure Laterality Date    BIOPSY OF BREAST, INCISIONAL      liver     COLONOSCOPY      UPPER GASTROINTESTINAL ENDOSCOPY         Current Outpatient Medications on File Prior to Visit   Medication Sig Dispense Refill    Multiple Vitamin (MULTIVITAMINS PO) Take 1 tablet by mouth daily      omeprazole (PRILOSEC) 20 MG delayed release capsule Take 1 capsule by mouth daily       No current facility-administered medications on file prior to visit.       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

## 2024-04-03 NOTE — PROGRESS NOTES
Chief Complaint   Patient presents with    Eye Problem     Blood vessel popped in L. eye x2 weeks       \"Have you been to the ER, urgent care clinic since your last visit?  Hospitalized since your last visit?\"    NO    “Have you seen or consulted any other health care providers outside of [facility] since your last visit?”    NO              Health Maintenance Due   Topic Date Due    COVID-19 Vaccine (1) Never done    Lipids  Never done    HPV vaccine (3 - Male 3-dose series) 12/02/2017    Hepatitis C screen  Never done    DTaP/Tdap/Td vaccine (7 - Td or Tdap) 05/23/2023

## 2024-07-17 ENCOUNTER — OFFICE VISIT (OUTPATIENT)
Facility: CLINIC | Age: 24
End: 2024-07-17
Payer: COMMERCIAL

## 2024-07-17 VITALS
BODY MASS INDEX: 39.08 KG/M2 | SYSTOLIC BLOOD PRESSURE: 130 MMHG | HEART RATE: 71 BPM | RESPIRATION RATE: 20 BRPM | OXYGEN SATURATION: 100 % | WEIGHT: 273 LBS | DIASTOLIC BLOOD PRESSURE: 84 MMHG | TEMPERATURE: 97.2 F | HEIGHT: 70 IN

## 2024-07-17 DIAGNOSIS — F41.1 GAD (GENERALIZED ANXIETY DISORDER): ICD-10-CM

## 2024-07-17 DIAGNOSIS — Z09 HOSPITAL DISCHARGE FOLLOW-UP: ICD-10-CM

## 2024-07-17 DIAGNOSIS — T59.811A SMOKE INHALATION: Primary | ICD-10-CM

## 2024-07-17 DIAGNOSIS — F51.02 ADJUSTMENT INSOMNIA: ICD-10-CM

## 2024-07-17 DIAGNOSIS — E66.01 SEVERE OBESITY (BMI 35.0-39.9) WITH COMORBIDITY (HCC): ICD-10-CM

## 2024-07-17 DIAGNOSIS — E78.2 MIXED HYPERLIPIDEMIA: ICD-10-CM

## 2024-07-17 DIAGNOSIS — K50.919 CROHN'S DISEASE WITH COMPLICATION, UNSPECIFIED GASTROINTESTINAL TRACT LOCATION (HCC): ICD-10-CM

## 2024-07-17 DIAGNOSIS — K52.0 GASTROENTERITIS AND COLITIS DUE TO RADIATION: ICD-10-CM

## 2024-07-17 DIAGNOSIS — J30.1 SEASONAL ALLERGIC RHINITIS DUE TO POLLEN: ICD-10-CM

## 2024-07-17 PROCEDURE — 1111F DSCHRG MED/CURRENT MED MERGE: CPT | Performed by: FAMILY MEDICINE

## 2024-07-17 PROCEDURE — 99214 OFFICE O/P EST MOD 30 MIN: CPT | Performed by: FAMILY MEDICINE

## 2024-07-17 RX ORDER — TRAZODONE HYDROCHLORIDE 50 MG/1
50 TABLET ORAL NIGHTLY PRN
Qty: 30 TABLET | Refills: 0 | Status: SHIPPED | OUTPATIENT
Start: 2024-07-17

## 2024-07-17 RX ORDER — HYDROXYZINE HYDROCHLORIDE 25 MG/1
25 TABLET, FILM COATED ORAL EVERY 8 HOURS PRN
Qty: 30 TABLET | Refills: 0 | Status: SHIPPED | OUTPATIENT
Start: 2024-07-17

## 2024-07-17 ASSESSMENT — PATIENT HEALTH QUESTIONNAIRE - PHQ9
1. LITTLE INTEREST OR PLEASURE IN DOING THINGS: SEVERAL DAYS
3. TROUBLE FALLING OR STAYING ASLEEP: MORE THAN HALF THE DAYS
6. FEELING BAD ABOUT YOURSELF - OR THAT YOU ARE A FAILURE OR HAVE LET YOURSELF OR YOUR FAMILY DOWN: NOT AT ALL
SUM OF ALL RESPONSES TO PHQ9 QUESTIONS 1 & 2: 1
SUM OF ALL RESPONSES TO PHQ QUESTIONS 1-9: 7
SUM OF ALL RESPONSES TO PHQ QUESTIONS 1-9: 7
9. THOUGHTS THAT YOU WOULD BE BETTER OFF DEAD, OR OF HURTING YOURSELF: NOT AT ALL
5. POOR APPETITE OR OVEREATING: MORE THAN HALF THE DAYS
4. FEELING TIRED OR HAVING LITTLE ENERGY: SEVERAL DAYS
SUM OF ALL RESPONSES TO PHQ QUESTIONS 1-9: 7
SUM OF ALL RESPONSES TO PHQ QUESTIONS 1-9: 7
8. MOVING OR SPEAKING SO SLOWLY THAT OTHER PEOPLE COULD HAVE NOTICED. OR THE OPPOSITE, BEING SO FIGETY OR RESTLESS THAT YOU HAVE BEEN MOVING AROUND A LOT MORE THAN USUAL: NOT AT ALL
10. IF YOU CHECKED OFF ANY PROBLEMS, HOW DIFFICULT HAVE THESE PROBLEMS MADE IT FOR YOU TO DO YOUR WORK, TAKE CARE OF THINGS AT HOME, OR GET ALONG WITH OTHER PEOPLE: SOMEWHAT DIFFICULT
7. TROUBLE CONCENTRATING ON THINGS, SUCH AS READING THE NEWSPAPER OR WATCHING TELEVISION: SEVERAL DAYS
2. FEELING DOWN, DEPRESSED OR HOPELESS: NOT AT ALL

## 2024-07-17 ASSESSMENT — ANXIETY QUESTIONNAIRES
6. BECOMING EASILY ANNOYED OR IRRITABLE: MORE THAN HALF THE DAYS
1. FEELING NERVOUS, ANXIOUS, OR ON EDGE: MORE THAN HALF THE DAYS
7. FEELING AFRAID AS IF SOMETHING AWFUL MIGHT HAPPEN: NOT AT ALL
5. BEING SO RESTLESS THAT IT IS HARD TO SIT STILL: SEVERAL DAYS
GAD7 TOTAL SCORE: 9
2. NOT BEING ABLE TO STOP OR CONTROL WORRYING: MORE THAN HALF THE DAYS
3. WORRYING TOO MUCH ABOUT DIFFERENT THINGS: SEVERAL DAYS
4. TROUBLE RELAXING: SEVERAL DAYS
IF YOU CHECKED OFF ANY PROBLEMS ON THIS QUESTIONNAIRE, HOW DIFFICULT HAVE THESE PROBLEMS MADE IT FOR YOU TO DO YOUR WORK, TAKE CARE OF THINGS AT HOME, OR GET ALONG WITH OTHER PEOPLE: SOMEWHAT DIFFICULT

## 2024-07-17 ASSESSMENT — ENCOUNTER SYMPTOMS
APNEA: 0
CHEST TIGHTNESS: 0
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
## 2024-07-18 LAB
ALBUMIN SERPL-MCNC: 4.2 G/DL (ref 3.5–5)
ALBUMIN/GLOB SERPL: 1.3 (ref 1.1–2.2)
ALP SERPL-CCNC: 77 U/L (ref 45–117)
ALT SERPL-CCNC: 56 U/L (ref 12–78)
ANION GAP SERPL CALC-SCNC: 8 MMOL/L (ref 5–15)
AST SERPL-CCNC: 92 U/L (ref 15–37)
BILIRUB SERPL-MCNC: 2.5 MG/DL (ref 0.2–1)
BUN SERPL-MCNC: 12 MG/DL (ref 6–20)
BUN/CREAT SERPL: 11 (ref 12–20)
CALCIUM SERPL-MCNC: 9.5 MG/DL (ref 8.5–10.1)
CHLORIDE SERPL-SCNC: 105 MMOL/L (ref 97–108)
CHOLEST SERPL-MCNC: 160 MG/DL
CO2 SERPL-SCNC: 25 MMOL/L (ref 21–32)
CREAT SERPL-MCNC: 1.05 MG/DL (ref 0.7–1.3)
ERYTHROCYTE [DISTWIDTH] IN BLOOD BY AUTOMATED COUNT: 13.2 % (ref 11.5–14.5)
GLOBULIN SER CALC-MCNC: 3.3 G/DL (ref 2–4)
GLUCOSE SERPL-MCNC: 87 MG/DL (ref 65–100)
HCT VFR BLD AUTO: 51.3 % (ref 36.6–50.3)
HDLC SERPL-MCNC: 34 MG/DL
HDLC SERPL: 4.7 (ref 0–5)
HGB BLD-MCNC: 17.1 G/DL (ref 12.1–17)
LDLC SERPL CALC-MCNC: 80.4 MG/DL (ref 0–100)
MCH RBC QN AUTO: 29 PG (ref 26–34)
MCHC RBC AUTO-ENTMCNC: 33.3 G/DL (ref 30–36.5)
MCV RBC AUTO: 86.9 FL (ref 80–99)
NRBC # BLD: 0 K/UL (ref 0–0.01)
NRBC BLD-RTO: 0 PER 100 WBC
PLATELET # BLD AUTO: 357 K/UL (ref 150–400)
PMV BLD AUTO: 9.7 FL (ref 8.9–12.9)
POTASSIUM SERPL-SCNC: 4.3 MMOL/L (ref 3.5–5.1)
PROT SERPL-MCNC: 7.5 G/DL (ref 6.4–8.2)
RBC # BLD AUTO: 5.9 M/UL (ref 4.1–5.7)
SODIUM SERPL-SCNC: 138 MMOL/L (ref 136–145)
TRIGL SERPL-MCNC: 228 MG/DL
VLDLC SERPL CALC-MCNC: 45.6 MG/DL
WBC # BLD AUTO: 8.7 K/UL (ref 4.1–11.1)

## 2024-08-26 ENCOUNTER — OFFICE VISIT (OUTPATIENT)
Facility: CLINIC | Age: 24
End: 2024-08-26
Payer: COMMERCIAL

## 2024-08-26 VITALS
TEMPERATURE: 97.7 F | OXYGEN SATURATION: 99 % | HEART RATE: 72 BPM | WEIGHT: 275 LBS | DIASTOLIC BLOOD PRESSURE: 77 MMHG | HEIGHT: 70 IN | RESPIRATION RATE: 14 BRPM | SYSTOLIC BLOOD PRESSURE: 125 MMHG | BODY MASS INDEX: 39.37 KG/M2

## 2024-08-26 DIAGNOSIS — Z00.00 EXAMINATION, MEDICAL, GENERAL: Primary | ICD-10-CM

## 2024-08-26 PROCEDURE — 93000 ELECTROCARDIOGRAM COMPLETE: CPT | Performed by: FAMILY MEDICINE

## 2024-08-26 PROCEDURE — 99395 PREV VISIT EST AGE 18-39: CPT | Performed by: FAMILY MEDICINE

## 2024-08-26 SDOH — ECONOMIC STABILITY: FOOD INSECURITY: WITHIN THE PAST 12 MONTHS, YOU WORRIED THAT YOUR FOOD WOULD RUN OUT BEFORE YOU GOT MONEY TO BUY MORE.: NEVER TRUE

## 2024-08-26 SDOH — ECONOMIC STABILITY: FOOD INSECURITY: WITHIN THE PAST 12 MONTHS, THE FOOD YOU BOUGHT JUST DIDN'T LAST AND YOU DIDN'T HAVE MONEY TO GET MORE.: NEVER TRUE

## 2024-08-26 SDOH — ECONOMIC STABILITY: INCOME INSECURITY: HOW HARD IS IT FOR YOU TO PAY FOR THE VERY BASICS LIKE FOOD, HOUSING, MEDICAL CARE, AND HEATING?: NOT HARD AT ALL

## 2024-08-26 ASSESSMENT — ENCOUNTER SYMPTOMS
COUGH: 0
SHORTNESS OF BREATH: 0

## 2024-08-26 NOTE — PROGRESS NOTES
\"Have you been to the ER, urgent care clinic since your last visit?  Hospitalized since your last visit?\"    NO    “Have you seen or consulted any other health care providers outside of [facility] since your last visit?”    NO            Click Here for Release of Records Request     Health Maintenance Due   Topic Date Due    HPV vaccine (3 - Male 3-dose series) 12/02/2017    Hepatitis C screen  Never done    DTaP/Tdap/Td vaccine (7 - Td or Tdap) 05/23/2023    COVID-19 Vaccine (1 - 2023-24 season) Never done    Flu vaccine (1) 08/01/2024
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
## 2024-08-26 NOTE — PATIENT INSTRUCTIONS
Well Visit, Ages 18 to 65: Care Instructions  Well visits can help you stay healthy. Your doctor has checked your overall health and may have suggested ways to take good care of yourself. Your doctor also may have recommended tests. You can help prevent illness with healthy eating, good sleep, vaccinations, regular exercise, and other steps.    Get the tests that you and your doctor decide on. Depending on your age and risks, examples might include screening for diabetes; hepatitis C; HIV; and cervical, breast, lung, and colon cancer. Screening helps find diseases before any symptoms appear.   Eat healthy foods. Choose fruits, vegetables, whole grains, lean protein, and low-fat dairy foods. Limit saturated fat and reduce salt.     Limit alcohol. Men should have no more than 2 drinks a day. Women should have no more than 1. For some people, no alcohol is the best choice.   Exercise. Get at least 30 minutes of exercise on most days of the week. Walking can be a good choice.     Reach and stay at your healthy weight. This will lower your risk for many health problems.   Take care of your mental health. Try to stay connected with friends, family, and community, and find ways to manage stress.     If you're feeling depressed or hopeless, talk to someone. A counselor can help. If you don't have a counselor, talk to your doctor.   Talk to your doctor if you think you may have a problem with alcohol or drug use. This includes prescription medicines, marijuana, and other drugs.     Avoid tobacco and nicotine: Don't smoke, vape, or chew. If you need help quitting, talk to your doctor.   Practice safer sex. Getting tested, using condoms or dental dams, and limiting sex partners can help prevent STIs.     Use birth control if it's important to you to prevent pregnancy. Talk with your doctor about your choices and what might be best for you.   Prevent problems where you can. Protect your skin from too much sun, wash your  hands, brush your teeth twice a day, and wear a seat belt in the car.   Where can you learn more?  Go to https://www.[employer].net/patientEd and enter P072 to learn more about \"Well Visit, Ages 18 to 65: Care Instructions.\"  Current as of: August 6, 2023  Content Version: 14.1  © 1434-5673 [employer].   Care instructions adapted under license by [employer]. If you have questions about a medical condition or this instruction, always ask your healthcare professional. [employer] disclaims any warranty or liability for your use of this information.

## 2025-03-24 ENCOUNTER — HOSPITAL ENCOUNTER (EMERGENCY)
Facility: HOSPITAL | Age: 25
Discharge: HOME | End: 2025-03-24
Payer: COMMERCIAL

## 2025-03-24 VITALS
SYSTOLIC BLOOD PRESSURE: 139 MMHG | TEMPERATURE: 99 F | OXYGEN SATURATION: 97 % | HEIGHT: 70 IN | BODY MASS INDEX: 37.22 KG/M2 | WEIGHT: 260 LBS | DIASTOLIC BLOOD PRESSURE: 87 MMHG | RESPIRATION RATE: 15 BRPM | HEART RATE: 85 BPM

## 2025-03-24 DIAGNOSIS — S01.01XA LACERATION OF SCALP, INITIAL ENCOUNTER: ICD-10-CM

## 2025-03-24 DIAGNOSIS — S09.90XA MINOR HEAD INJURY, INITIAL ENCOUNTER: Primary | ICD-10-CM

## 2025-03-24 PROCEDURE — 6360000002 HC RX W HCPCS: Performed by: NURSE PRACTITIONER

## 2025-03-24 PROCEDURE — 6370000000 HC RX 637 (ALT 250 FOR IP): Performed by: NURSE PRACTITIONER

## 2025-03-24 PROCEDURE — 12002 RPR S/N/AX/GEN/TRNK2.6-7.5CM: CPT

## 2025-03-24 PROCEDURE — 99284 EMERGENCY DEPT VISIT MOD MDM: CPT

## 2025-03-24 PROCEDURE — 96374 THER/PROPH/DIAG INJ IV PUSH: CPT

## 2025-03-24 RX ORDER — MORPHINE SULFATE 4 MG/ML
4 INJECTION INTRAVENOUS ONCE
Refills: 0 | Status: COMPLETED | OUTPATIENT
Start: 2025-03-24 | End: 2025-03-24

## 2025-03-24 RX ORDER — ACETAMINOPHEN 325 MG/1
650 TABLET ORAL
Status: COMPLETED | OUTPATIENT
Start: 2025-03-24 | End: 2025-03-24

## 2025-03-24 RX ORDER — HYDROCODONE BITARTRATE AND ACETAMINOPHEN 5; 325 MG/1; MG/1
1 TABLET ORAL
Refills: 0 | Status: COMPLETED | OUTPATIENT
Start: 2025-03-24 | End: 2025-03-24

## 2025-03-24 RX ADMIN — HYDROCODONE BITARTRATE AND ACETAMINOPHEN 1 TABLET: 5; 325 TABLET ORAL at 22:57

## 2025-03-24 RX ADMIN — ACETAMINOPHEN 650 MG: 325 TABLET ORAL at 22:57

## 2025-03-24 RX ADMIN — MORPHINE SULFATE 4 MG: 4 INJECTION, SOLUTION INTRAMUSCULAR; INTRAVENOUS at 22:02

## 2025-03-24 ASSESSMENT — PAIN DESCRIPTION - LOCATION: LOCATION: HEAD

## 2025-03-24 ASSESSMENT — PAIN SCALES - GENERAL: PAINLEVEL_OUTOF10: 8

## 2025-03-24 ASSESSMENT — LIFESTYLE VARIABLES
HOW OFTEN DO YOU HAVE A DRINK CONTAINING ALCOHOL: NEVER
HOW MANY STANDARD DRINKS CONTAINING ALCOHOL DO YOU HAVE ON A TYPICAL DAY: PATIENT DOES NOT DRINK
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
## 2025-03-28 ENCOUNTER — OFFICE VISIT (OUTPATIENT)
Facility: CLINIC | Age: 25
End: 2025-03-28
Payer: COMMERCIAL

## 2025-03-28 VITALS
HEART RATE: 78 BPM | HEIGHT: 70 IN | SYSTOLIC BLOOD PRESSURE: 126 MMHG | TEMPERATURE: 98.5 F | OXYGEN SATURATION: 98 % | DIASTOLIC BLOOD PRESSURE: 84 MMHG | BODY MASS INDEX: 38.8 KG/M2 | RESPIRATION RATE: 20 BRPM | WEIGHT: 271 LBS

## 2025-03-28 DIAGNOSIS — S01.01XA SCALP LACERATION, INITIAL ENCOUNTER: ICD-10-CM

## 2025-03-28 DIAGNOSIS — K50.919 CROHN'S DISEASE WITH COMPLICATION, UNSPECIFIED GASTROINTESTINAL TRACT LOCATION (HCC): ICD-10-CM

## 2025-03-28 DIAGNOSIS — G89.29 CHRONIC PAIN OF RIGHT ANKLE: Primary | ICD-10-CM

## 2025-03-28 DIAGNOSIS — M25.571 CHRONIC PAIN OF RIGHT ANKLE: Primary | ICD-10-CM

## 2025-03-28 DIAGNOSIS — S06.0X0A CONCUSSION WITHOUT LOSS OF CONSCIOUSNESS, INITIAL ENCOUNTER: ICD-10-CM

## 2025-03-28 DIAGNOSIS — K21.9 GASTROESOPHAGEAL REFLUX DISEASE WITHOUT ESOPHAGITIS: ICD-10-CM

## 2025-03-28 PROCEDURE — 99214 OFFICE O/P EST MOD 30 MIN: CPT | Performed by: FAMILY MEDICINE

## 2025-03-28 RX ORDER — OMEPRAZOLE 20 MG/1
20 CAPSULE, DELAYED RELEASE ORAL DAILY
Qty: 90 CAPSULE | Refills: 1 | Status: SHIPPED | OUTPATIENT
Start: 2025-03-28

## 2025-03-28 SDOH — ECONOMIC STABILITY: FOOD INSECURITY: WITHIN THE PAST 12 MONTHS, YOU WORRIED THAT YOUR FOOD WOULD RUN OUT BEFORE YOU GOT MONEY TO BUY MORE.: NEVER TRUE

## 2025-03-28 SDOH — ECONOMIC STABILITY: FOOD INSECURITY: WITHIN THE PAST 12 MONTHS, THE FOOD YOU BOUGHT JUST DIDN'T LAST AND YOU DIDN'T HAVE MONEY TO GET MORE.: NEVER TRUE

## 2025-03-28 ASSESSMENT — PATIENT HEALTH QUESTIONNAIRE - PHQ9
7. TROUBLE CONCENTRATING ON THINGS, SUCH AS READING THE NEWSPAPER OR WATCHING TELEVISION: NOT AT ALL
SUM OF ALL RESPONSES TO PHQ QUESTIONS 1-9: 6
5. POOR APPETITE OR OVEREATING: MORE THAN HALF THE DAYS
9. THOUGHTS THAT YOU WOULD BE BETTER OFF DEAD, OR OF HURTING YOURSELF: NOT AT ALL
1. LITTLE INTEREST OR PLEASURE IN DOING THINGS: SEVERAL DAYS
8. MOVING OR SPEAKING SO SLOWLY THAT OTHER PEOPLE COULD HAVE NOTICED. OR THE OPPOSITE, BEING SO FIGETY OR RESTLESS THAT YOU HAVE BEEN MOVING AROUND A LOT MORE THAN USUAL: NOT AT ALL
10. IF YOU CHECKED OFF ANY PROBLEMS, HOW DIFFICULT HAVE THESE PROBLEMS MADE IT FOR YOU TO DO YOUR WORK, TAKE CARE OF THINGS AT HOME, OR GET ALONG WITH OTHER PEOPLE: NOT DIFFICULT AT ALL
4. FEELING TIRED OR HAVING LITTLE ENERGY: NOT AT ALL
SUM OF ALL RESPONSES TO PHQ QUESTIONS 1-9: 6
2. FEELING DOWN, DEPRESSED OR HOPELESS: NOT AT ALL
SUM OF ALL RESPONSES TO PHQ QUESTIONS 1-9: 6
6. FEELING BAD ABOUT YOURSELF - OR THAT YOU ARE A FAILURE OR HAVE LET YOURSELF OR YOUR FAMILY DOWN: SEVERAL DAYS
SUM OF ALL RESPONSES TO PHQ QUESTIONS 1-9: 6
3. TROUBLE FALLING OR STAYING ASLEEP: MORE THAN HALF THE DAYS

## 2025-03-28 ASSESSMENT — ENCOUNTER SYMPTOMS
ABDOMINAL DISTENTION: 0
ABDOMINAL PAIN: 0
CHEST TIGHTNESS: 0
APNEA: 0

## 2025-03-28 NOTE — PROGRESS NOTES
\"Have you been to the ER, urgent care clinic since your last visit?  Hospitalized since your last visit?\"    no    “Have you seen or consulted any other health care providers outside our system since your last visit?”    no              Goals that were addressed and/or need to be completed during or after this appointment include   Health Maintenance Due   Topic Date Due    Meningococcal B vaccine (1 of 2 - Standard) Never done    HPV vaccine (3 - Male 3-dose series) 12/02/2017    Hepatitis C screen  Never done    Flu vaccine (1) 08/01/2024    COVID-19 Vaccine (1 - 2024-25 season) Never done

## 2025-03-28 NOTE — PATIENT INSTRUCTIONS
Record 2 weeks if diet.    [facility]  Affiliated with [address]  [address] [phone]    Monitor blood pressure outside the office several times weekly at different times during the day and evening.   Blood Pressure Record     Patient Name:  _____Wallace Dean_________________ :  ________2000________    Date/Time BP Reading Pulse
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
## 2025-04-23 ENCOUNTER — OFFICE VISIT (OUTPATIENT)
Facility: CLINIC | Age: 25
End: 2025-04-23
Payer: COMMERCIAL

## 2025-04-23 VITALS
RESPIRATION RATE: 18 BRPM | DIASTOLIC BLOOD PRESSURE: 77 MMHG | WEIGHT: 276 LBS | BODY MASS INDEX: 39.51 KG/M2 | TEMPERATURE: 99 F | HEIGHT: 70 IN | OXYGEN SATURATION: 98 % | SYSTOLIC BLOOD PRESSURE: 135 MMHG | HEART RATE: 79 BPM

## 2025-04-23 DIAGNOSIS — E66.812 CLASS 2 OBESITY DUE TO EXCESS CALORIES WITHOUT SERIOUS COMORBIDITY WITH BODY MASS INDEX (BMI) OF 39.0 TO 39.9 IN ADULT: ICD-10-CM

## 2025-04-23 DIAGNOSIS — E66.09 CLASS 2 OBESITY DUE TO EXCESS CALORIES WITHOUT SERIOUS COMORBIDITY WITH BODY MASS INDEX (BMI) OF 39.0 TO 39.9 IN ADULT: ICD-10-CM

## 2025-04-23 DIAGNOSIS — E78.2 MIXED HYPERLIPIDEMIA: Primary | ICD-10-CM

## 2025-04-23 PROCEDURE — 99214 OFFICE O/P EST MOD 30 MIN: CPT | Performed by: FAMILY MEDICINE

## 2025-04-23 RX ORDER — BUPROPION HYDROCHLORIDE 150 MG/1
150 TABLET ORAL EVERY MORNING
Qty: 30 TABLET | Refills: 3 | Status: SHIPPED | OUTPATIENT
Start: 2025-04-23

## 2025-04-23 ASSESSMENT — ENCOUNTER SYMPTOMS
APNEA: 0
CHEST TIGHTNESS: 0
ABDOMINAL DISTENTION: 0

## 2025-04-23 NOTE — PROGRESS NOTES
[facility]    History of Present Illness:   Wallace Dean is a 24 y.o. male with history of HLD, Obesity, Anxiety   CC: Weight Follow up  History provided by patient and Records    HPI:  Obesity:   Duration of problem:    Peceived Ideal weight: 220 lbs   Highest weight: 276 lbs   Lowest Adult weight: 220 Lbs   Failed diet plans: Decreased portions, \"Chicken and Rice\", Atkins   Lifestyle Modification: Avoiding soda, Starting Meal prep, Avoiding Fas food   Exercise: At least 150 minutes a week, typically more   Current diet plan: Meal prep and calorie counting, reduction by 500 kcal daily.     Wt Readings from Last 3 Encounters:   04/23/25 125.2 kg (276 lb)   03/28/25 122.9 kg (271 lb)   03/24/25 117.9 kg (260 lb)                 has not lost weight   Body mass index is 39.6 kg/m².       Diet history:     Breakfast: Eggs and protein, occasional bread    Lunch: Mixed but typically sandwhich/hot dog    Dinner: Protein, some carbs, vegetables    Snacks: None    Drinks: Water, Tea, Milk    Cravings: None    Dietary preferences/cultural restrictions: None    Have you ever tried any diets/structured weight loss programs?: None    Has patient ever been on medications for weight loss? If so, which ones, how long, and how effective were they?: None    Has patient ever had surgery for weight loss?: None    Right Ankle pain: Persistent issue, improved with Fish oil, needs to get XR.    Health Maintenance  Health Maintenance Due   Topic Date Due    Meningococcal B vaccine (1 of 2 - Standard) Never done    HPV vaccine (3 - Male 3-dose series) 12/02/2017    Hepatitis C screen  Never done    Pneumococcal 0-49 years Vaccine (1 of 2 - PCV) 06/11/2020    COVID-19 Vaccine (1 - 2024-25 season) Never done       Past Medical, Family, and Social History:     Current Outpatient Medications on File Prior to Visit   Medication Sig Dispense Refill    omeprazole (PRILOSEC) 20 MG delayed release capsule Take 1 capsule by

## 2025-04-23 NOTE — PROGRESS NOTES
Chief Complaint   Patient presents with    Follow-up     Weight management, ankle pain, BP         \"Have you been to the ER, urgent care clinic since your last visit?  Hospitalized since your last visit?\"    NO    “Have you seen or consulted any other health care providers outside of [facility] since your last visit?”    NO            Click Here for Release of Records Request     Health Maintenance Due   Topic Date Due    Meningococcal B vaccine (1 of 2 - Standard) Never done    HPV vaccine (3 - Male 3-dose series) 12/02/2017    Hepatitis C screen  Never done    Pneumococcal 0-49 years Vaccine (1 of 2 - PCV) 06/11/2020    COVID-19 Vaccine (1 - 2024-25 season) Never done

## 2025-05-30 ENCOUNTER — TELEPHONE (OUTPATIENT)
Facility: CLINIC | Age: 25
End: 2025-05-30

## 2025-05-30 DIAGNOSIS — A59.9 INFECTION DUE TO TRICHOMONAS: Primary | ICD-10-CM

## 2025-05-30 RX ORDER — METRONIDAZOLE 500 MG/1
500 TABLET ORAL 2 TIMES DAILY
Qty: 14 TABLET | Refills: 0 | Status: SHIPPED | OUTPATIENT
Start: 2025-05-30 | End: 2025-06-06

## 2025-05-30 NOTE — TELEPHONE ENCOUNTER
Patient called, notified per Dr. Griffith-      Can you pass along that I have called in [employer] for patient.     Verbalizes understanding and thanked for information.

## 2025-05-30 NOTE — TELEPHONE ENCOUNTER
Patient girlfriend was tested for STI, and tested positive for trich and is being treated. Requests rx. Denies any sx.

## 2025-06-03 ENCOUNTER — OFFICE VISIT (OUTPATIENT)
Facility: CLINIC | Age: 25
End: 2025-06-03
Payer: COMMERCIAL

## 2025-06-03 VITALS
WEIGHT: 274 LBS | RESPIRATION RATE: 18 BRPM | HEIGHT: 70 IN | OXYGEN SATURATION: 99 % | HEART RATE: 86 BPM | TEMPERATURE: 98.1 F | DIASTOLIC BLOOD PRESSURE: 88 MMHG | SYSTOLIC BLOOD PRESSURE: 134 MMHG | BODY MASS INDEX: 39.22 KG/M2

## 2025-06-03 DIAGNOSIS — K50.919 CROHN'S DISEASE WITH COMPLICATION, UNSPECIFIED GASTROINTESTINAL TRACT LOCATION (HCC): ICD-10-CM

## 2025-06-03 DIAGNOSIS — A59.9 INFECTION DUE TO TRICHOMONAS (VAGINALIS): Primary | ICD-10-CM

## 2025-06-03 DIAGNOSIS — E66.812 CLASS 2 OBESITY DUE TO EXCESS CALORIES WITHOUT SERIOUS COMORBIDITY WITH BODY MASS INDEX (BMI) OF 39.0 TO 39.9 IN ADULT: ICD-10-CM

## 2025-06-03 DIAGNOSIS — E66.09 CLASS 2 OBESITY DUE TO EXCESS CALORIES WITHOUT SERIOUS COMORBIDITY WITH BODY MASS INDEX (BMI) OF 39.0 TO 39.9 IN ADULT: ICD-10-CM

## 2025-06-03 PROCEDURE — 99214 OFFICE O/P EST MOD 30 MIN: CPT | Performed by: FAMILY MEDICINE

## 2025-06-03 ASSESSMENT — ENCOUNTER SYMPTOMS
CHEST TIGHTNESS: 0
ABDOMINAL PAIN: 0
ABDOMINAL DISTENTION: 0
APNEA: 0

## 2025-06-03 NOTE — PROGRESS NOTES
[facility]    History of Present Illness:   Wallace Dean is a 24 y.o. male with history of HLD, Obesity, Anxiety   CC: Follow up weight  History provided by patient and Records    HPI:  Exposure To Trichomonas: Continuing Flagyl    Obesity:              Duration of problem:               Peceived Ideal weight: 220 lbs              Highest weight: 276 lbs              Lowest Adult weight: 220 Lbs              Failed diet plans: Decreased portions, \"Chicken and Rice\", Atkins              Lifestyle Modification: Avoiding soda, Starting Meal prep, Avoiding Fas food              Exercise: At least 150 minutes a week, typically more              Current diet plan: Meal prep and calorie counting, reduction by 500 kcal daily (Goal 30%).     Wt Readings from Last 3 Encounters:   06/03/25 124.3 kg (274 lb)   04/23/25 125.2 kg (276 lb)   03/28/25 122.9 kg (271 lb)                  Has been losing then gaining back   Body mass index is 39.31 kg/m².       Right Ankle pain: Persistent issue, improved with Fish oil, XR Normal    Crohn's: Stable.    Health Maintenance  Health Maintenance Due   Topic Date Due    Meningococcal B vaccine (1 of 2 - Standard) Never done    HPV vaccine (3 - Male 3-dose series) 12/02/2017    Hepatitis C screen  Never done    Pneumococcal 0-49 years Vaccine (1 of 2 - PCV) 06/11/2020    COVID-19 Vaccine (1 - 2024-25 season) Never done       Past Medical, Family, and Social History:     Current Outpatient Medications on File Prior to Visit   Medication Sig Dispense Refill    metroNIDAZOLE (FLAGYL) 500 MG tablet Take 1 tablet by mouth 2 times daily for 7 days 14 tablet 0    buPROPion (WELLBUTRIN XL) 150 MG extended release tablet Take 1 tablet by mouth every morning 30 tablet 3    omeprazole (PRILOSEC) 20 MG delayed release capsule Take 1 capsule by mouth daily 90 capsule 1    hydrOXYzine HCl (ATARAX) 25 MG tablet Take 1 tablet by mouth every 8 hours as needed for Itching 30 tablet 0

## 2025-06-03 NOTE — PROGRESS NOTES
\"Have you been to the ER, urgent care clinic since your last visit?  Hospitalized since your last visit?\"    NO    “Have you seen or consulted any other health care providers outside of [facility] since your last visit?”    NO            Click Here for Release of Records Request